# Patient Record
Sex: MALE | Employment: OTHER | URBAN - METROPOLITAN AREA
[De-identification: names, ages, dates, MRNs, and addresses within clinical notes are randomized per-mention and may not be internally consistent; named-entity substitution may affect disease eponyms.]

---

## 2020-03-05 ENCOUNTER — CONSULTATION (OUTPATIENT)
Dept: URBAN - METROPOLITAN AREA CLINIC 11 | Facility: CLINIC | Age: 65
End: 2020-03-05

## 2020-03-05 ASSESSMENT — VISUAL ACUITY
OS_SC: 20/40-2
OD_SC: 20/40

## 2020-03-05 ASSESSMENT — TONOMETRY
OD_IOP_MMHG: 17
OS_IOP_MMHG: 16

## 2020-03-05 NOTE — PATIENT DISCUSSION
Discussed the options of vitrectomy surgery with membrane peeling vs. observation alone. Risks, benefits and alternatives were discussed in detail. All questions were answered. There is no membrane present in the left eye. Patient desires to proceed with surgery in the right eye.